# Patient Record
Sex: MALE | Race: OTHER | HISPANIC OR LATINO | URBAN - METROPOLITAN AREA
[De-identification: names, ages, dates, MRNs, and addresses within clinical notes are randomized per-mention and may not be internally consistent; named-entity substitution may affect disease eponyms.]

---

## 2023-05-09 ENCOUNTER — HOSPITAL ENCOUNTER (EMERGENCY)
Facility: HOSPITAL | Age: 33
Discharge: HOME | End: 2023-05-09
Attending: EMERGENCY MEDICINE

## 2023-05-09 VITALS
OXYGEN SATURATION: 98 % | BODY MASS INDEX: 35.46 KG/M2 | RESPIRATION RATE: 18 BRPM | WEIGHT: 207.69 LBS | HEIGHT: 64 IN | TEMPERATURE: 98 F | HEART RATE: 80 BPM | DIASTOLIC BLOOD PRESSURE: 76 MMHG | SYSTOLIC BLOOD PRESSURE: 143 MMHG

## 2023-05-09 DIAGNOSIS — S05.31XA CORNEAL LACERATION OF RIGHT EYE, INITIAL ENCOUNTER: Primary | ICD-10-CM

## 2023-05-09 PROCEDURE — 25000003 PHARM REV CODE 250: Mod: ER | Performed by: NURSE PRACTITIONER

## 2023-05-09 PROCEDURE — 99283 EMERGENCY DEPT VISIT LOW MDM: CPT | Mod: ER

## 2023-05-09 PROCEDURE — 90471 IMMUNIZATION ADMIN: CPT | Mod: ER | Performed by: NURSE PRACTITIONER

## 2023-05-09 PROCEDURE — 90714 TD VACC NO PRESV 7 YRS+ IM: CPT | Mod: ER | Performed by: NURSE PRACTITIONER

## 2023-05-09 PROCEDURE — 63600175 PHARM REV CODE 636 W HCPCS: Mod: ER | Performed by: NURSE PRACTITIONER

## 2023-05-09 RX ORDER — POLYMYXIN B SULFATE AND TRIMETHOPRIM 1; 10000 MG/ML; [USP'U]/ML
1 SOLUTION OPHTHALMIC EVERY 6 HOURS
Qty: 2.6667 ML | Refills: 0 | Status: SHIPPED | OUTPATIENT
Start: 2023-05-09 | End: 2023-05-19

## 2023-05-09 RX ORDER — ERYTHROMYCIN 5 MG/G
OINTMENT OPHTHALMIC
Status: COMPLETED | OUTPATIENT
Start: 2023-05-09 | End: 2023-05-09

## 2023-05-09 RX ADMIN — FLUORESCEIN SODIUM AND BENOXINATE HYDROCHLORIDE 1 DROP: 2.5; 4 SOLUTION OPHTHALMIC at 02:05

## 2023-05-09 RX ADMIN — ERYTHROMYCIN 1 INCH: 5 OINTMENT OPHTHALMIC at 02:05

## 2023-05-09 RX ADMIN — CLOSTRIDIUM TETANI TOXOID ANTIGEN (FORMALDEHYDE INACTIVATED) AND CORYNEBACTERIUM DIPHTHERIAE TOXOID ANTIGEN (FORMALDEHYDE INACTIVATED) 0.5 ML: 5; 2 INJECTION, SUSPENSION INTRAMUSCULAR at 02:05

## 2023-05-09 NOTE — ED PROVIDER NOTES
"Encounter Date: 5/9/2023       History     Chief Complaint   Patient presents with    Foreign Body in Eye     Possible wood splinter in R eye, pt was chopping at tree when incident happen      Per  at beside, pt c/o pain and possible foreign body to right eye.  Today he was cutting a tree and believes a piece of wood went in his eye.  Pt states his vision "is a little blurry but not a lot."      Review of patient's allergies indicates:  No Known Allergies  No past medical history on file.  No past surgical history on file.  No family history on file.     Review of Systems   Eyes:  Positive for pain and redness.   All other systems reviewed and are negative.    Physical Exam     Initial Vitals [05/09/23 1345]   BP Pulse Resp Temp SpO2   (!) 143/76 80 18 97.8 °F (36.6 °C) 98 %      MAP       --         Physical Exam    Nursing note and vitals reviewed.  Constitutional: He appears well-developed and well-nourished. No distress.   HENT:   Head: Normocephalic.   Eyes: Lids are normal. Pupils are equal, round, and reactive to light.       Neck:   Normal range of motion.  Cardiovascular:  Normal rate.           Pulmonary/Chest: Breath sounds normal.   Abdominal: Abdomen is soft.   Musculoskeletal:         General: Normal range of motion.      Cervical back: Normal range of motion.     Neurological: He is alert and oriented to person, place, and time. He has normal strength.   Skin: Skin is warm and dry.       ED Course   Procedures  Labs Reviewed - No data to display       Imaging Results    None          Medications   fluorescein-benoxinate 0.25-0.4% ophthalmic solution 1 drop (1 drop Right Eye Given 5/9/23 2042)   tetanus-diphther toxoids vaccine (PF) (TENIVAC) injection (0.5 mLs Intramuscular Given 5/9/23 1410)   erythromycin 5 mg/gram (0.5 %) ophthalmic ointment (1 inch Right Eye Given 5/9/23 1426)     Medical Decision Making:   History:   I obtained history from: someone other than patient.       <> Summary " "of History:    Initial Assessment:   Foreign body  Sclera abrasion  Corneal abrasion   Open globe injury            ED Course as of 05/09/23 1549   Tue May 09, 2023   1418 Exam with woods lamp indicated corneal laceration < 1 cm, pt states he no longer feels like a FB is present "just a cut on my eye."  [NL]      ED Course User Index  [NL] Mercy Villarreal NP                 Clinical Impression:   Final diagnoses:  [S05.31XA] Corneal laceration of right eye, initial encounter (Primary)        ED Disposition Condition    Discharge Stable          ED Prescriptions       Medication Sig Dispense Start Date End Date Auth. Provider    polymyxin B sulf-trimethoprim (POLYTRIM) 10,000 unit- 1 mg/mL Drop Place 1 drop into the right eye every 6 (six) hours. for 10 days 2.6667 mL 5/9/2023 5/19/2023 Mercy Villarreal NP          Follow-up Information       Follow up With Specialties Details Why Contact Info    OPHTHAMOLOGIST  Schedule an appointment as soon as possible for a visit today For wound re-check, Further evaluation and treatment, Schedule appt in 1-2 days              Mercy Villarreal NP  05/09/23 1426       Mercy Villarreal NP  05/09/23 1549    "